# Patient Record
Sex: MALE | Race: BLACK OR AFRICAN AMERICAN | URBAN - METROPOLITAN AREA
[De-identification: names, ages, dates, MRNs, and addresses within clinical notes are randomized per-mention and may not be internally consistent; named-entity substitution may affect disease eponyms.]

---

## 2017-11-21 ENCOUNTER — OFFICE VISIT (OUTPATIENT)
Dept: FAMILY MEDICINE CLINIC | Facility: CLINIC | Age: 22
End: 2017-11-21

## 2017-11-21 VITALS — TEMPERATURE: 98 F | HEART RATE: 91 BPM | RESPIRATION RATE: 16 BRPM | OXYGEN SATURATION: 98 %

## 2017-11-21 DIAGNOSIS — J40 BRONCHITIS: Primary | ICD-10-CM

## 2017-11-21 PROCEDURE — 99213 OFFICE O/P EST LOW 20 MIN: CPT | Performed by: PHYSICIAN ASSISTANT

## 2017-11-21 RX ORDER — PREDNISONE 20 MG/1
TABLET ORAL
Qty: 10 TABLET | Refills: 0 | Status: SHIPPED | OUTPATIENT
Start: 2017-11-21 | End: 2018-12-20 | Stop reason: ALTCHOICE

## 2017-11-21 RX ORDER — AZITHROMYCIN 250 MG/1
TABLET, FILM COATED ORAL
Qty: 6 TABLET | Refills: 0 | Status: SHIPPED | OUTPATIENT
Start: 2017-11-21 | End: 2018-12-20 | Stop reason: ALTCHOICE

## 2017-11-21 NOTE — PROGRESS NOTES
CHIEF COMPLAINT:   Patient presents with:  Cough: congestion, chest heaviness for 1 week. HPI:   Cirilo Berman is a 25year old male who presents for cough and congestion for  1  weeks. He felt chilled and feverish a few days ago but not since. male who presents with:     ASSESSMENT:  Bronchitis  (primary encounter diagnosis)    PLAN:  Continue albuterol prn. Add prednisone. Continue otc cough suppressant, declines tessalon.   With recurrent fever/chills start zpack and follow up PCP but ok to h

## 2018-12-20 ENCOUNTER — OFFICE VISIT (OUTPATIENT)
Dept: FAMILY MEDICINE CLINIC | Facility: CLINIC | Age: 23
End: 2018-12-20
Payer: COMMERCIAL

## 2018-12-20 VITALS
RESPIRATION RATE: 18 BRPM | SYSTOLIC BLOOD PRESSURE: 112 MMHG | OXYGEN SATURATION: 98 % | WEIGHT: 273.38 LBS | TEMPERATURE: 101 F | HEIGHT: 68 IN | DIASTOLIC BLOOD PRESSURE: 66 MMHG | HEART RATE: 110 BPM | BODY MASS INDEX: 41.43 KG/M2

## 2018-12-20 DIAGNOSIS — J02.9 SORE THROAT: Primary | ICD-10-CM

## 2018-12-20 DIAGNOSIS — J02.9 ACUTE VIRAL PHARYNGITIS: ICD-10-CM

## 2018-12-20 DIAGNOSIS — Z11.3 SCREEN FOR STD (SEXUALLY TRANSMITTED DISEASE): ICD-10-CM

## 2018-12-20 PROCEDURE — 87880 STREP A ASSAY W/OPTIC: CPT | Performed by: FAMILY MEDICINE

## 2018-12-20 PROCEDURE — 87491 CHLMYD TRACH DNA AMP PROBE: CPT | Performed by: FAMILY MEDICINE

## 2018-12-20 PROCEDURE — 99203 OFFICE O/P NEW LOW 30 MIN: CPT | Performed by: FAMILY MEDICINE

## 2018-12-20 PROCEDURE — 87081 CULTURE SCREEN ONLY: CPT | Performed by: FAMILY MEDICINE

## 2018-12-20 PROCEDURE — 87591 N.GONORRHOEAE DNA AMP PROB: CPT | Performed by: FAMILY MEDICINE

## 2018-12-20 RX ORDER — ALBUTEROL SULFATE 90 UG/1
AEROSOL, METERED RESPIRATORY (INHALATION) EVERY 6 HOURS PRN
COMMUNITY

## 2018-12-20 NOTE — PATIENT INSTRUCTIONS
When You Have a Sore Throat    A sore throat can be painful. There are many reasons why you may have a sore throat. Your healthcare provider will work with you to find the cause of your sore throat. He or she will also find the best treatment for you.   Esther Oliver During the exam, your healthcare provider checks your ears, nose, and throat for problems.  He or she also checks for swelling in the neck, and may listen to your chest.  Possible tests  Other tests your healthcare provider may perform include:  · A throat If your sore throat is due to a bacterial infection, antibiotics may speed healing and prevent complications.  Although group A streptococcus (\"strep throat\" or GAS) is the major treatable infection for a sore throat, GAS causes only 5% to 15% of sore thr © 0298-1006 [employer] [address] [facility] [place], [address] [place]. All rights reserved. This information is not intended as a substitute for professional medical care. Always follow your healthcare professional's instructions.         Doris Mccormick In both men and women, symptoms of anal gonorrhea include:  · Anal itching  · Soreness  · Bleeding   · Painful bowel movements   If you're pregnant, you may have this test as part of prenatal testing.  A pregnant woman can pass the infection to her baby dur © 7875-2118 [employer] [address]. [address] [facility] [place], [address] [place]. All rights reserved. This information is not intended as a substitute for professional medical care. Always follow your healthcare professional's instructions.         Testing Follow up with your healthcare provider, or as advised. Call as directed for the results of your test. This is to be sure the infection has cleared.  Follow up with your provider or the public health department for complete STI screening, including HIV test

## 2018-12-20 NOTE — PROGRESS NOTES
CHIEF COMPLAINT: Patient presents with:  Sore Throat: x3d  Body ache and/or chills  STD: pt requesting testing today        HPI:     Chris Haley is a 26year old male presents for sore throat with bodyaches. Also wants STI testing.     Pt is here for a dysuria, discharge, penile swelling, scrotal swelling, genital sores and testicular pain. Musculoskeletal: Positive for myalgias. Pertinent positives and negatives noted in the the HPI.     PHYSICAL EXAM:   /66 (BP Location: Left arm, Patient PCP. Safe sex practices d/w pt.  - CHLAMYDIA/GONOCOCCUS, STEPHEN;  Future  - CHLAMYDIA/GONOCOCCUS, STEPHEN      Meds This Visit:  Requested Prescriptions      No prescriptions requested or ordered in this encounter       Health Maintenance:  Annual Physical due on

## 2018-12-22 ENCOUNTER — TELEPHONE (OUTPATIENT)
Dept: FAMILY MEDICINE CLINIC | Facility: CLINIC | Age: 23
End: 2018-12-22

## 2018-12-22 NOTE — TELEPHONE ENCOUNTER
Rec'd TC form pt, request STD testing results. Informed pt of neg STD testing. Pt asking about getting copies of lab results. PSR will text pt the [employer] information and code to set up.

## 2018-12-23 ENCOUNTER — TELEPHONE (OUTPATIENT)
Dept: FAMILY MEDICINE CLINIC | Facility: CLINIC | Age: 23
End: 2018-12-23

## 2018-12-23 NOTE — TELEPHONE ENCOUNTER
Patient called this morning. Reports that he can not view his throat culture results or his STD results through [employer]. I released throat culture results and rapid strep results and patient V/U and verified that he was able to review it.  (Patient has spok

## 2018-12-24 ENCOUNTER — TELEPHONE (OUTPATIENT)
Dept: FAMILY MEDICINE CLINIC | Facility: CLINIC | Age: 23
End: 2018-12-24

## 2018-12-24 NOTE — TELEPHONE ENCOUNTER
Spoke with pt, reviewed results and recommendations, pt verbalized understanding    Notes recorded by Ashli Lewis MD on 12/21/2018 at 3:06 PM CST  Please call pt to inform him his GC/CHlamydia results are normal. Thank you.